# Patient Record
Sex: FEMALE | Race: OTHER | URBAN - METROPOLITAN AREA
[De-identification: names, ages, dates, MRNs, and addresses within clinical notes are randomized per-mention and may not be internally consistent; named-entity substitution may affect disease eponyms.]

---

## 2021-10-30 ENCOUNTER — EMERGENCY (EMERGENCY)
Facility: HOSPITAL | Age: 41
LOS: 1 days | Discharge: HOME | End: 2021-10-30
Attending: EMERGENCY MEDICINE | Admitting: EMERGENCY MEDICINE
Payer: COMMERCIAL

## 2021-10-30 VITALS
DIASTOLIC BLOOD PRESSURE: 73 MMHG | TEMPERATURE: 101 F | SYSTOLIC BLOOD PRESSURE: 112 MMHG | RESPIRATION RATE: 20 BRPM | WEIGHT: 160.94 LBS | OXYGEN SATURATION: 100 % | HEIGHT: 65 IN | HEART RATE: 138 BPM

## 2021-10-30 DIAGNOSIS — Z87.42 PERSONAL HISTORY OF OTHER DISEASES OF THE FEMALE GENITAL TRACT: ICD-10-CM

## 2021-10-30 DIAGNOSIS — E03.9 HYPOTHYROIDISM, UNSPECIFIED: ICD-10-CM

## 2021-10-30 DIAGNOSIS — R10.30 LOWER ABDOMINAL PAIN, UNSPECIFIED: ICD-10-CM

## 2021-10-30 DIAGNOSIS — R50.9 FEVER, UNSPECIFIED: ICD-10-CM

## 2021-10-30 LAB
ALBUMIN SERPL ELPH-MCNC: 4.2 G/DL — SIGNIFICANT CHANGE UP (ref 3.3–5)
ALP SERPL-CCNC: 35 U/L — LOW (ref 40–120)
ALT FLD-CCNC: 8 U/L — LOW (ref 10–45)
ANION GAP SERPL CALC-SCNC: 13 MMOL/L — SIGNIFICANT CHANGE UP (ref 5–17)
APPEARANCE UR: CLEAR — SIGNIFICANT CHANGE UP
APTT BLD: 32.3 SEC — SIGNIFICANT CHANGE UP (ref 27.5–35.5)
AST SERPL-CCNC: 15 U/L — SIGNIFICANT CHANGE UP (ref 10–40)
BASOPHILS # BLD AUTO: 0.03 K/UL — SIGNIFICANT CHANGE UP (ref 0–0.2)
BASOPHILS NFR BLD AUTO: 0.3 % — SIGNIFICANT CHANGE UP (ref 0–2)
BILIRUB SERPL-MCNC: 0.5 MG/DL — SIGNIFICANT CHANGE UP (ref 0.2–1.2)
BILIRUB UR-MCNC: NEGATIVE — SIGNIFICANT CHANGE UP
BUN SERPL-MCNC: 8 MG/DL — SIGNIFICANT CHANGE UP (ref 7–23)
CALCIUM SERPL-MCNC: 9.3 MG/DL — SIGNIFICANT CHANGE UP (ref 8.4–10.5)
CHLORIDE SERPL-SCNC: 101 MMOL/L — SIGNIFICANT CHANGE UP (ref 96–108)
CO2 SERPL-SCNC: 22 MMOL/L — SIGNIFICANT CHANGE UP (ref 22–31)
COLOR SPEC: YELLOW — SIGNIFICANT CHANGE UP
CREAT SERPL-MCNC: 0.76 MG/DL — SIGNIFICANT CHANGE UP (ref 0.5–1.3)
DIFF PNL FLD: ABNORMAL
EOSINOPHIL # BLD AUTO: 0 K/UL — SIGNIFICANT CHANGE UP (ref 0–0.5)
EOSINOPHIL NFR BLD AUTO: 0 % — SIGNIFICANT CHANGE UP (ref 0–6)
GLUCOSE SERPL-MCNC: 115 MG/DL — HIGH (ref 70–99)
GLUCOSE UR QL: NEGATIVE — SIGNIFICANT CHANGE UP
HCT VFR BLD CALC: 34.2 % — LOW (ref 34.5–45)
HGB BLD-MCNC: 11.9 G/DL — SIGNIFICANT CHANGE UP (ref 11.5–15.5)
HIV 1+2 AB+HIV1 P24 AG SERPL QL IA: SIGNIFICANT CHANGE UP
IMM GRANULOCYTES NFR BLD AUTO: 0.2 % — SIGNIFICANT CHANGE UP (ref 0–1.5)
INR BLD: 1.18 — HIGH (ref 0.88–1.16)
KETONES UR-MCNC: ABNORMAL MG/DL
LACTATE SERPL-SCNC: 0.9 MMOL/L — SIGNIFICANT CHANGE UP (ref 0.5–2)
LEUKOCYTE ESTERASE UR-ACNC: NEGATIVE — SIGNIFICANT CHANGE UP
LYMPHOCYTES # BLD AUTO: 1.49 K/UL — SIGNIFICANT CHANGE UP (ref 1–3.3)
LYMPHOCYTES # BLD AUTO: 17 % — SIGNIFICANT CHANGE UP (ref 13–44)
MCHC RBC-ENTMCNC: 27.7 PG — SIGNIFICANT CHANGE UP (ref 27–34)
MCHC RBC-ENTMCNC: 34.8 GM/DL — SIGNIFICANT CHANGE UP (ref 32–36)
MCV RBC AUTO: 79.5 FL — LOW (ref 80–100)
MONOCYTES # BLD AUTO: 0.82 K/UL — SIGNIFICANT CHANGE UP (ref 0–0.9)
MONOCYTES NFR BLD AUTO: 9.4 % — SIGNIFICANT CHANGE UP (ref 2–14)
NEUTROPHILS # BLD AUTO: 6.38 K/UL — SIGNIFICANT CHANGE UP (ref 1.8–7.4)
NEUTROPHILS NFR BLD AUTO: 73.1 % — SIGNIFICANT CHANGE UP (ref 43–77)
NITRITE UR-MCNC: NEGATIVE — SIGNIFICANT CHANGE UP
NRBC # BLD: 0 /100 WBCS — SIGNIFICANT CHANGE UP (ref 0–0)
PH UR: 6 — SIGNIFICANT CHANGE UP (ref 5–8)
PLATELET # BLD AUTO: 245 K/UL — SIGNIFICANT CHANGE UP (ref 150–400)
POTASSIUM SERPL-MCNC: 3.3 MMOL/L — LOW (ref 3.5–5.3)
POTASSIUM SERPL-SCNC: 3.3 MMOL/L — LOW (ref 3.5–5.3)
PROT SERPL-MCNC: 7.8 G/DL — SIGNIFICANT CHANGE UP (ref 6–8.3)
PROT UR-MCNC: NEGATIVE MG/DL — SIGNIFICANT CHANGE UP
PROTHROM AB SERPL-ACNC: 14 SEC — HIGH (ref 10.6–13.6)
RAPID RVP RESULT: SIGNIFICANT CHANGE UP
RBC # BLD: 4.3 M/UL — SIGNIFICANT CHANGE UP (ref 3.8–5.2)
RBC # FLD: 13.2 % — SIGNIFICANT CHANGE UP (ref 10.3–14.5)
SARS-COV-2 RNA SPEC QL NAA+PROBE: SIGNIFICANT CHANGE UP
SODIUM SERPL-SCNC: 136 MMOL/L — SIGNIFICANT CHANGE UP (ref 135–145)
SP GR SPEC: <=1.005 — SIGNIFICANT CHANGE UP (ref 1–1.03)
UROBILINOGEN FLD QL: 0.2 E.U./DL — SIGNIFICANT CHANGE UP
WBC # BLD: 8.74 K/UL — SIGNIFICANT CHANGE UP (ref 3.8–10.5)
WBC # FLD AUTO: 8.74 K/UL — SIGNIFICANT CHANGE UP (ref 3.8–10.5)

## 2021-10-30 PROCEDURE — 71045 X-RAY EXAM CHEST 1 VIEW: CPT | Mod: 26

## 2021-10-30 PROCEDURE — 99285 EMERGENCY DEPT VISIT HI MDM: CPT

## 2021-10-30 RX ORDER — POTASSIUM CHLORIDE 20 MEQ
40 PACKET (EA) ORAL ONCE
Refills: 0 | Status: COMPLETED | OUTPATIENT
Start: 2021-10-30 | End: 2021-10-30

## 2021-10-30 RX ORDER — CEFTRIAXONE 500 MG/1
1000 INJECTION, POWDER, FOR SOLUTION INTRAMUSCULAR; INTRAVENOUS ONCE
Refills: 0 | Status: COMPLETED | OUTPATIENT
Start: 2021-10-30 | End: 2021-10-30

## 2021-10-30 RX ORDER — SODIUM CHLORIDE 9 MG/ML
2300 INJECTION INTRAMUSCULAR; INTRAVENOUS; SUBCUTANEOUS ONCE
Refills: 0 | Status: COMPLETED | OUTPATIENT
Start: 2021-10-30 | End: 2021-10-30

## 2021-10-30 RX ORDER — METRONIDAZOLE 500 MG
500 TABLET ORAL ONCE
Refills: 0 | Status: COMPLETED | OUTPATIENT
Start: 2021-10-30 | End: 2021-10-30

## 2021-10-30 RX ORDER — ACETAMINOPHEN 500 MG
650 TABLET ORAL ONCE
Refills: 0 | Status: COMPLETED | OUTPATIENT
Start: 2021-10-30 | End: 2021-10-30

## 2021-10-30 RX ADMIN — Medication 100 MILLIGRAM(S): at 20:49

## 2021-10-30 RX ADMIN — Medication 500 MILLIGRAM(S): at 21:50

## 2021-10-30 RX ADMIN — CEFTRIAXONE 100 MILLIGRAM(S): 500 INJECTION, POWDER, FOR SOLUTION INTRAMUSCULAR; INTRAVENOUS at 21:53

## 2021-10-30 RX ADMIN — SODIUM CHLORIDE 2300 MILLILITER(S): 9 INJECTION INTRAMUSCULAR; INTRAVENOUS; SUBCUTANEOUS at 20:49

## 2021-10-30 RX ADMIN — Medication 650 MILLIGRAM(S): at 20:49

## 2021-10-30 NOTE — ED PROVIDER NOTE - OBJECTIVE STATEMENT
41 yo hx of fibroids, hypothyroidism w complaints fever for two days, and lower abd pain. 39 yo hx of fibroids, hypothyroidism w complaints fever for two days, and lower abd pain. States has been diagnosed with PID in the past but currently denies vaginal discharge, dysuria, n/v. Denies  black/bloody stool, urinary complaints, focal weakness/numbness, lightheadedness, back pain,  rashes, recent travel, recent antibiotics, sick contacts, SOB/CP, URI symptoms. Normal PO intake, normal BMs. no hx of sti's.

## 2021-10-30 NOTE — ED PROVIDER NOTE - PATIENT PORTAL LINK FT
You can access the FollowMyHealth Patient Portal offered by [facility] by registering at the following website: [email or web address]. By joining [employer]’s FollowMyHealth portal, you will also be able to view your health information using other applications (apps) compatible with our system.

## 2021-10-30 NOTE — ED ADULT NURSE NOTE - OBJECTIVE STATEMENT
Pt presents to ED with c/o lower abdminal pain and fever x 2 days, states took "1 tylenol 30 mins prior to arrival." Denies constipation, diarrhea, N/V/D, CP or SOB at this time. H/o fibroids.

## 2021-10-30 NOTE — ED ADULT TRIAGE NOTE - ARRIVAL INFO ADDITIONAL COMMENTS
pt c/o 2 days of fever.  recent work up for abd pain has revealed fibroids.   pt developed diarrhea after taking motrin.   tmax 102 with last tylenol 30 mins ago.

## 2021-10-30 NOTE — ED PROVIDER NOTE - NSFOLLOWUPINSTRUCTIONS_ED_ALL_ED_FT
Pelvic Inflammatory Disease    WHAT YOU NEED TO KNOW:    PID is an infection of your reproductive organs. This includes your ovaries, fallopian tubes, uterus, cervix (lower area of your uterus), and vagina. The infection causes these organs to become inflamed.     Female Reproductive System         DISCHARGE INSTRUCTIONS:    Call your doctor if:   •You have severe pain in your lower abdomen.      •You have nausea or are vomiting.      •You have chills or a high fever.      •Your symptoms get worse or do not improve after 3 days of treatment.      •Your skin is red, itchy, or you have a new rash.      •You think or know you are pregnant.      •You have questions or concerns about your condition or care.      Medicines:   •Antibiotics are given to fight the bacterial infection that caused your PID.       •Take your medicine as directed. Contact your healthcare provider if you think your medicine is not helping or if you have side effects. Tell him of her if you are allergic to any medicine. Keep a list of the medicines, vitamins, and herbs you take. Include the amounts, and when and why you take them. Bring the list or the pill bottles to follow-up visits. Carry your medicine list with you in case of an emergency.      Manage PID:   •Finish your treatment. If you do not finish your treatment for PID, your infection may not go away. You may also have an increased risk for another STI in the future.      •Do not have sex until your healthcare provider says it is okay. You will need to finish treatment before it is safe to have sex.      •Talk to your sex partners. If you have an STI, tell your recent partners. Tell them to see a healthcare provider for testing and treatment. This will help stop the spread of infection to others or back to you.      Decrease your risk for PID:   •Do not have unprotected sex. Always use a latex condom. Do not have sex while you or your partners are being treated for an STI.       •Get tested for STIs before and after new sex partners. Talk to your partner and ask them to get tested.       •Do not delay treatment for STIs or vaginal infections. Talk to your healthcare provider if you think you have an STI. Early treatment can prevent health problems that may lead to PID.       Follow up with your doctor as directed: You may need to return for a follow up visit. Your treatment may need to be changed if your symptoms are not getting better. You may need more tests if your symptoms do not go away or worsen after treatment. Write down your questions so you remember to ask them during your visits.

## 2021-10-30 NOTE — ED ADULT NURSE NOTE - CHPI ED NUR SYMPTOMS NEG
no abdominal distension/no blood in stool/no burning urination/no diarrhea/no dysuria/no hematuria/no nausea/no vomiting

## 2021-10-30 NOTE — ED PROVIDER NOTE - CLINICAL SUMMARY MEDICAL DECISION MAKING FREE TEXT BOX
Fever, abd pain Pt w hx of PID presenting w Fever, lower abd pain, no meningismus, rash, labs, CT a/p completed w no acute process except large R cyst, US completed for further eval ?TOA. No other source of fever, pt w no resp sx, cxr, rvp/covid neg, UA neg, ?PID. Gyn evaluated pt, disc w US tech also, cervical motion tenderness on US completion, and on gyn exam, will dc w abx for presumed PID, pt seems reliable, w , strict return prec given.

## 2021-10-30 NOTE — ED PROVIDER NOTE - PHYSICAL EXAMINATION

## 2021-10-31 VITALS
HEART RATE: 80 BPM | OXYGEN SATURATION: 100 % | RESPIRATION RATE: 18 BRPM | SYSTOLIC BLOOD PRESSURE: 106 MMHG | TEMPERATURE: 99 F | DIASTOLIC BLOOD PRESSURE: 70 MMHG

## 2021-10-31 LAB
BACTERIA # UR AUTO: PRESENT /HPF
EPI CELLS # UR: SIGNIFICANT CHANGE UP /HPF (ref 0–5)
RBC CASTS # UR COMP ASSIST: < 5 /HPF — SIGNIFICANT CHANGE UP
WBC UR QL: < 5 /HPF — SIGNIFICANT CHANGE UP

## 2021-10-31 PROCEDURE — 87070 CULTURE OTHR SPECIMN AEROBIC: CPT

## 2021-10-31 PROCEDURE — 81001 URINALYSIS AUTO W/SCOPE: CPT

## 2021-10-31 PROCEDURE — 76830 TRANSVAGINAL US NON-OB: CPT

## 2021-10-31 PROCEDURE — 36415 COLL VENOUS BLD VENIPUNCTURE: CPT

## 2021-10-31 PROCEDURE — 96365 THER/PROPH/DIAG IV INF INIT: CPT

## 2021-10-31 PROCEDURE — 87661 TRICHOMONAS VAGINALIS AMPLIF: CPT

## 2021-10-31 PROCEDURE — 85610 PROTHROMBIN TIME: CPT

## 2021-10-31 PROCEDURE — 0225U NFCT DS DNA&RNA 21 SARSCOV2: CPT

## 2021-10-31 PROCEDURE — 80053 COMPREHEN METABOLIC PANEL: CPT

## 2021-10-31 PROCEDURE — 74177 CT ABD & PELVIS W/CONTRAST: CPT | Mod: MA

## 2021-10-31 PROCEDURE — 96375 TX/PRO/DX INJ NEW DRUG ADDON: CPT

## 2021-10-31 PROCEDURE — 87801 DETECT AGNT MULT DNA AMPLI: CPT

## 2021-10-31 PROCEDURE — 87040 BLOOD CULTURE FOR BACTERIA: CPT

## 2021-10-31 PROCEDURE — 76856 US EXAM PELVIC COMPLETE: CPT | Mod: 26,59

## 2021-10-31 PROCEDURE — 87389 HIV-1 AG W/HIV-1&-2 AB AG IA: CPT

## 2021-10-31 PROCEDURE — 87591 N.GONORRHOEAE DNA AMP PROB: CPT

## 2021-10-31 PROCEDURE — 83605 ASSAY OF LACTIC ACID: CPT

## 2021-10-31 PROCEDURE — 87086 URINE CULTURE/COLONY COUNT: CPT

## 2021-10-31 PROCEDURE — 93005 ELECTROCARDIOGRAM TRACING: CPT

## 2021-10-31 PROCEDURE — 74177 CT ABD & PELVIS W/CONTRAST: CPT | Mod: 26,MA

## 2021-10-31 PROCEDURE — 85025 COMPLETE CBC W/AUTO DIFF WBC: CPT

## 2021-10-31 PROCEDURE — 76856 US EXAM PELVIC COMPLETE: CPT

## 2021-10-31 PROCEDURE — 71045 X-RAY EXAM CHEST 1 VIEW: CPT

## 2021-10-31 PROCEDURE — 99284 EMERGENCY DEPT VISIT MOD MDM: CPT | Mod: 25

## 2021-10-31 PROCEDURE — 87563 M. GENITALIUM AMP PROBE: CPT

## 2021-10-31 PROCEDURE — 85730 THROMBOPLASTIN TIME PARTIAL: CPT

## 2021-10-31 PROCEDURE — 87798 DETECT AGENT NOS DNA AMP: CPT

## 2021-10-31 PROCEDURE — 76830 TRANSVAGINAL US NON-OB: CPT | Mod: 26

## 2021-10-31 RX ORDER — METRONIDAZOLE 500 MG
1 TABLET ORAL
Qty: 28 | Refills: 0
Start: 2021-10-31 | End: 2021-11-13

## 2021-10-31 RX ORDER — ACETAMINOPHEN 500 MG
975 TABLET ORAL ONCE
Refills: 0 | Status: DISCONTINUED | OUTPATIENT
Start: 2021-10-31 | End: 2021-11-03

## 2021-10-31 RX ADMIN — Medication 40 MILLIEQUIVALENT(S): at 00:14

## 2021-10-31 NOTE — CONSULT NOTE ADULT - ASSESSMENT
40y  with Last Menstrual Period 5 days ago presenting with lower abd pain that has been recurring for 2 months, and fever which began 2 days ago  - given fevers here, physical exam findings of mild CMT and fundal tenderness, differential includes PID. Patient hemodynamically stable, and clinically appears well. pelvic ultrasound and CT without evidence of TOA. Recommend outpatient treatment with doxycycline 100mg BID for 14 days, and metronidazole PO 500mg BID for 14 days. Patient instructed to f/u with her GYN Thursday or Friday of next week. cervical culture and vaginitis panel taken, will f/u.  - pelvic US and physical exam without evidence of ovarian torsion.   - Lung etiology for fever unlikely given negative RVP and covid, with no pulmonary symptoms  - urinary etiology unlikely given no urinary symptoms, with UA negative for nitrites or LE. Will f/u urine culture.  - d/w Dr. Gonzales

## 2021-10-31 NOTE — CONSULT NOTE ADULT - SUBJECTIVE AND OBJECTIVE BOX
40y  with Last Menstrual Period 5 days ago presenting with lower abd pain that has been recurring for 2 months, and fever which began 2 days ago. She reports lower abd pain without alleviating or aggravating factors, comes and goes, 8/10 at the worst, no rated 5/10. She see's her private GYN Dr. Swenson who is not affiliated with [facility]. She had a colonoscopy done on 9/10 to r/u GI etiologies which was normal. Her gyn thought that she may have had BV, and was given 10 days of doxycycline and 7 days of vaginal clindamycin. She reports interval improvement in pain with the abx, but it returned afterwards. Outpatient pelvic US significant for small fibroid and ovarian cysts. She was started on OCP a month ago to see if the cysts resolve. 2 days ago she had fevers/chills at home, with temp of 103 deg F at the highest. She reports 5/10 abd pain and intermittent chills currently, and anorexia. Denies sob, coughs, cp, n/v, fevers, palpitations, issues with urination or BM.    OB H/x: SAB in     GYN H/x: hx of fibroid and ovarian cyst as per HPI. Denies hx of abnormal pap smears or STI's    MED H/x: hashimoto's    SURG H/x: colonoscopy     Medications: levothyroxine 75 mcg qd  Allergies: ibuprofen (diarrhea)       Vital Signs Last 24 Hrs  T(C): 38.4 (31 Oct 2021 03:16), Max: 38.6 (30 Oct 2021 20:02)  T(F): 101.2 (31 Oct 2021 03:16), Max: 101.5 (30 Oct 2021 20:02)  HR: 100 (31 Oct 2021 03:16) (80 - 138)  BP: 116/82 (31 Oct 2021 03:16) (106/68 - 116/82)  BP(mean): --  RR: 18 (31 Oct 2021 03:16) (18 - 20)  SpO2: 98% (31 Oct 2021 03:16) (98% - 100%)    Physical Exam:  Gen: NAD, comfortable  GI: soft, tender to palpation in the b/l lower quadrants, nondistended + BS, no rebound no guarding  BME: normal anteverted uterus, no adnexal masses appreciated , Some cervical motion tenderness, some uterine tenderness  Spec: trace of old blood in the vaginal vault, minimal erythema on the cervix    LABS:                        11.9   8.74  )-----------( 245      ( 30 Oct 2021 21:02 )             34.2     10-30    136  |  101  |  8   ----------------------------<  115<H>  3.3<L>   |  22  |  0.76    Ca    9.3      30 Oct 2021 21:02    TPro  7.8  /  Alb  4.2  /  TBili  0.5  /  DBili  x   /  AST  15  /  ALT  8<L>  /  AlkPhos  35<L>  10-30    PT/INR - ( 30 Oct 2021 21:02 )   PT: 14.0 sec;   INR: 1.18          PTT - ( 30 Oct 2021 21:02 )  PTT:32.3 sec  Urinalysis Basic - ( 30 Oct 2021 23:10 )    Color: Yellow / Appearance: Clear / SG: <=1.005 / pH: x  Gluc: x / Ketone: Trace mg/dL  / Bili: Negative / Urobili: 0.2 E.U./dL   Blood: x / Protein: NEGATIVE mg/dL / Nitrite: NEGATIVE   Leuk Esterase: NEGATIVE / RBC: < 5 /HPF / WBC < 5 /HPF   Sq Epi: x / Non Sq Epi: 0-5 /HPF / Bacteria: Present /HPF        RADIOLOGY & ADDITIONAL STUDIES:    EXAM:  CT ABDOMEN AND PELVIS IC                          PROCEDURE DATE:  10/31/2021          INTERPRETATION:  CT SCAN OF ABDOMEN AND PELVIS    History: Lower abdominal pain and fever    Technique: CT scan of abdomen and pelvis was performed from lung bases through symphysis pubis. 94 cc of Isovue-370 was administered intravenously. No oral contrast was administered. Axial, sagittal and coronal reformatted images were reviewed.    Comparison: None.    Findings:    Lower chest: Normal.    Liver: Small ill-defined enhancing region in the lateral right dome is suggestive of a vascular shunt.    Gallbladder: No radiopaque stones.    BIle Ducts: Normal.    Spleen:  Normal.    Pancreas:  Normal.    Adrenal glands:  Normal.    Kidneys: Normal.    Adenopathy:  No lymphadenopathy in abdomen or pelvis.    Ascites: None.    Gastrointestinal tract: Normal appendix. No pneumoperitoneum or bowel obstruction. No definite bowel wall thickening on this examination without enteric contrast.    Vessels: Normal.    Pelvic organs: Normal uterus, left ovary, and bladder. 4 x 3 x 4 cm multicystic right ovary with a dominant 2.7 cm cyst. No significant surrounding inflammatory changes.    Bladder: Normal.    Soft tissues: Small fat-containing umbilical hernia.    Bones: Normal.      Impression:  4 cm multicystic right ovary with a dominant 2 cm cyst. No significant surrounding inflammatory changes. Consider further evaluation with ultrasound.        --- End of Report ---          Thank you for the opportunity to participate in the care of this patient.    CA SOLORIO MD; Resident Radiologist  This document has been electronically signed.  CLEMENT ZAMORA MD; Attending Radiologist  This document has been electronically signed. Oct 31 2021  2:18AM      EXAM:  US TRANSVAGINAL                          EXAM:  US PELVIC COMPLETE                          PROCEDURE DATE:  10/31/2021          INTERPRETATION:  CLINICAL INFORMATION: Right lower quadrant pain    LMP: 10/26/2021    COMPARISON: Same visit CTabdomen and pelvis dated 10/30/2021    TECHNIQUE:  Endovaginal and transabdominal pelvic sonogram. Color and Spectral Doppler was performed.    FINDINGS:    Uterus: 6 x 3 x 5 cm. 0.7 x 0.5 x 0.7 cm intramural leiomyoma of the posterior uterine body.  Endometrium: 0.4 cm. Within normal limits.    Right ovary: 4.3 x 2.9 x 3.6 cm for a volume of 24 cc. 3.7 x 2.2 x 2.6 cm avascular complex cystic structure with internal echoes. Normal arterial and venous waveforms.  Left ovary: 2.5 x 1.9 x 1.4 cm fora volume of 4 cc. Within normal limits. Normal arterial and venous waveforms.    Fluid: None.      IMPRESSION:  No evidence of ovarian torsion. 3.7 cm complex cystic structure within the right adnexa for which the differential includes endometrioma and hemorrhagic cyst. Recommend ultrasound in 6-12 weeks to assess for resolution.    --- End of Report ---          Thank you for the opportunity to participate in the care of this patient.    CA SOLORIO MD; Resident Radiologist  This document hasbeen electronically signed.  CLEMENT ZAMORA MD; Attending Radiologist  This document has been electronically signed. Oct 31 2021  4:12AM

## 2021-11-01 LAB
CULTURE RESULTS: NO GROWTH — SIGNIFICANT CHANGE UP
SPECIMEN SOURCE: SIGNIFICANT CHANGE UP

## 2021-11-02 LAB
CULTURE RESULTS: SIGNIFICANT CHANGE UP
SPECIMEN SOURCE: SIGNIFICANT CHANGE UP

## 2021-11-03 LAB
A VAGINAE DNA VAG QL NAA+PROBE: SIGNIFICANT CHANGE UP
BVAB2 DNA VAG QL NAA+PROBE: SIGNIFICANT CHANGE UP
C ALBICANS DNA VAG QL NAA+PROBE: NEGATIVE — SIGNIFICANT CHANGE UP
C GLABRATA DNA VAG QL NAA+PROBE: NEGATIVE — SIGNIFICANT CHANGE UP
M GENITALIUM DNA SPEC QL NAA+PROBE: NEGATIVE — SIGNIFICANT CHANGE UP
M HOMINIS DNA SPEC QL NAA+PROBE: NEGATIVE — SIGNIFICANT CHANGE UP
MEGA1 DNA VAG QL NAA+PROBE: SIGNIFICANT CHANGE UP
T VAGINALIS RRNA SPEC QL NAA+PROBE: NEGATIVE — SIGNIFICANT CHANGE UP
UREAPLASMA DNA SPEC QL NAA+PROBE: NEGATIVE — SIGNIFICANT CHANGE UP

## 2021-11-05 LAB
CULTURE RESULTS: SIGNIFICANT CHANGE UP
CULTURE RESULTS: SIGNIFICANT CHANGE UP
SPECIMEN SOURCE: SIGNIFICANT CHANGE UP
SPECIMEN SOURCE: SIGNIFICANT CHANGE UP

## 2025-03-19 ENCOUNTER — APPOINTMENT (OUTPATIENT)
Dept: OTOLARYNGOLOGY | Facility: CLINIC | Age: 45
End: 2025-03-19
Payer: COMMERCIAL

## 2025-03-19 ENCOUNTER — NON-APPOINTMENT (OUTPATIENT)
Age: 45
End: 2025-03-19

## 2025-03-19 VITALS
SYSTOLIC BLOOD PRESSURE: 123 MMHG | DIASTOLIC BLOOD PRESSURE: 87 MMHG | HEIGHT: 66 IN | HEART RATE: 81 BPM | WEIGHT: 170 LBS | TEMPERATURE: 98.6 F | OXYGEN SATURATION: 100 % | BODY MASS INDEX: 27.32 KG/M2

## 2025-03-19 DIAGNOSIS — H93.A1 PULSATILE TINNITUS, RIGHT EAR: ICD-10-CM

## 2025-03-19 DIAGNOSIS — Z83.3 FAMILY HISTORY OF DIABETES MELLITUS: ICD-10-CM

## 2025-03-19 DIAGNOSIS — Z86.39 PERSONAL HISTORY OF OTHER ENDOCRINE, NUTRITIONAL AND METABOLIC DISEASE: ICD-10-CM

## 2025-03-19 DIAGNOSIS — Z78.9 OTHER SPECIFIED HEALTH STATUS: ICD-10-CM

## 2025-03-19 DIAGNOSIS — Z83.49 FAMILY HISTORY OF OTHER ENDOCRINE, NUTRITIONAL AND METABOLIC DISEASES: ICD-10-CM

## 2025-03-19 DIAGNOSIS — Z82.49 FAMILY HISTORY OF ISCHEMIC HEART DISEASE AND OTHER DISEASES OF THE CIRCULATORY SYSTEM: ICD-10-CM

## 2025-03-19 PROBLEM — Z00.00 ENCOUNTER FOR PREVENTIVE HEALTH EXAMINATION: Status: ACTIVE | Noted: 2025-03-19

## 2025-03-19 PROCEDURE — 99203 OFFICE O/P NEW LOW 30 MIN: CPT
